# Patient Record
(demographics unavailable — no encounter records)

---

## 2025-06-12 NOTE — HISTORY OF PRESENT ILLNESS
[FreeTextEntry1] : Patient has no history of Mi, CHf or CP syndrome  She is a recent smoker -. quit in 3/25 She has had elevated BPs (130s-140s/80s-90s) -. never treated  She also has HLD , also never treated and pre-DM (better with diet reportedly  She has had SOB for years She had CP which started in 2/25, under left axilla and left breast, at rest, lasting a few seconds   She has had 2 stress tests  -. the last one was in 4/25 -> ok  She also had echo in 4/25 and  coronary ca score -. recommended high dose statin but pt decided against it   Past cardiologist Dr. Rose Gallo

## 2025-06-12 NOTE — REASON FOR VISIT
[Hypertension] : hypertension [Coronary Artery Disease] : coronary artery disease [FreeTextEntry3] : Dr Sami Engel

## 2025-06-19 NOTE — HISTORY OF PRESENT ILLNESS
[FreeTextEntry1] : Doing well, denying chest pain, shortness of breath, palpitations or dizziness  Exercising  Checked a few blood pressure numbers.  Brought her BP machine when she came from labs recently.  Reportedly, her systolic was 10 points higher on her home machine.  So that makes her numbers mostly 120s over 80s